# Patient Record
Sex: FEMALE | NOT HISPANIC OR LATINO
[De-identification: names, ages, dates, MRNs, and addresses within clinical notes are randomized per-mention and may not be internally consistent; named-entity substitution may affect disease eponyms.]

---

## 2018-07-30 ENCOUNTER — APPOINTMENT (OUTPATIENT)
Dept: GYNECOLOGIC ONCOLOGY | Facility: CLINIC | Age: 23
End: 2018-07-30

## 2021-06-08 ENCOUNTER — APPOINTMENT (OUTPATIENT)
Dept: OTOLARYNGOLOGY | Facility: CLINIC | Age: 26
End: 2021-06-08
Payer: COMMERCIAL

## 2021-06-08 ENCOUNTER — NON-APPOINTMENT (OUTPATIENT)
Age: 26
End: 2021-06-08

## 2021-06-08 VITALS
HEART RATE: 101 BPM | WEIGHT: 160 LBS | HEIGHT: 61 IN | SYSTOLIC BLOOD PRESSURE: 128 MMHG | TEMPERATURE: 98 F | BODY MASS INDEX: 30.21 KG/M2 | DIASTOLIC BLOOD PRESSURE: 91 MMHG

## 2021-06-08 DIAGNOSIS — R51.9 HEADACHE, UNSPECIFIED: ICD-10-CM

## 2021-06-08 DIAGNOSIS — J34.3 HYPERTROPHY OF NASAL TURBINATES: ICD-10-CM

## 2021-06-08 DIAGNOSIS — J34.2 DEVIATED NASAL SEPTUM: ICD-10-CM

## 2021-06-08 DIAGNOSIS — R09.81 NASAL CONGESTION: ICD-10-CM

## 2021-06-08 DIAGNOSIS — R06.83 SNORING: ICD-10-CM

## 2021-06-08 DIAGNOSIS — J34.89 OTHER SPECIFIED DISORDERS OF NOSE AND NASAL SINUSES: ICD-10-CM

## 2021-06-08 DIAGNOSIS — G89.29 HEADACHE, UNSPECIFIED: ICD-10-CM

## 2021-06-08 PROCEDURE — 99072 ADDL SUPL MATRL&STAF TM PHE: CPT

## 2021-06-08 PROCEDURE — 31231 NASAL ENDOSCOPY DX: CPT

## 2021-06-08 PROCEDURE — 99204 OFFICE O/P NEW MOD 45 MIN: CPT | Mod: 25

## 2021-06-08 NOTE — ASSESSMENT
[FreeTextEntry1] : Pt with chronic nasal congestion, on exam sigmoidal septal deviation, with b/l BITH. Pt has tried many different types of OTC nasal sprays with no relief. \par - advised turbinate reduction \par - Counseled patient at length on pathophysiology all allergies and techniques for avoidance. handouts given.\par - Risks benefits and alternatives to bilateral inferior turbinate reduction discussed. risks of bleeding, infection, empty nose syndrome, as well as continued nasal obstruction were discussed. Patient understood risks and would like to continue with the operation.\par - explained snoring is not corrected with this procedure \par may need full septum, will see how it goes

## 2021-06-08 NOTE — PHYSICAL EXAM
[Midline] : trachea located in midline position [Normal] : no rashes [de-identified] : +large tongue

## 2021-06-08 NOTE — CONSULT LETTER
[Please see my note below.] : Please see my note below. [FreeTextEntry1] : Dear Dr. MAGNO NIETO \par I had the pleasure of evaluating your patient ANA PAULA MARRERO, thank you for allowing us to participate in their care. please see full note detailing our visit below.\par If you have any questions, please do not hesitate to call me and I would be happy to discuss further. \par \par Griffin Kerns M.D.\par Attending Physician,  \par Department of Otolaryngology - Head and Neck Surgery\par [facility] \par Office: [phone]\par Fax: [phone]\par \par

## 2021-06-08 NOTE — END OF VISIT
[FreeTextEntry3] : I personally saw and examined ANA PAULA MARRERO in detail. I spoke to ROXANNE Kamara regarding the assessment and plan of care.  I preformed the procedures and I reviewed the above assessment and plan of care, and agree. I have made changes in changes in the body of the note where appropriate.\par \par
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
## 2022-03-22 ENCOUNTER — APPOINTMENT (OUTPATIENT)
Dept: PULMONOLOGY | Facility: CLINIC | Age: 27
End: 2022-03-22